# Patient Record
Sex: FEMALE | Race: WHITE | NOT HISPANIC OR LATINO | URBAN - METROPOLITAN AREA
[De-identification: names, ages, dates, MRNs, and addresses within clinical notes are randomized per-mention and may not be internally consistent; named-entity substitution may affect disease eponyms.]

---

## 2017-01-09 ENCOUNTER — EMERGENCY (EMERGENCY)
Facility: HOSPITAL | Age: 37
LOS: 1 days | Discharge: HOME | End: 2017-01-09
Attending: EMERGENCY MEDICINE | Admitting: EMERGENCY MEDICINE
Payer: COMMERCIAL

## 2017-01-09 VITALS
DIASTOLIC BLOOD PRESSURE: 83 MMHG | OXYGEN SATURATION: 100 % | TEMPERATURE: 98 F | HEART RATE: 73 BPM | SYSTOLIC BLOOD PRESSURE: 142 MMHG | RESPIRATION RATE: 18 BRPM

## 2017-01-09 VITALS
HEART RATE: 68 BPM | RESPIRATION RATE: 18 BRPM | DIASTOLIC BLOOD PRESSURE: 84 MMHG | OXYGEN SATURATION: 100 % | SYSTOLIC BLOOD PRESSURE: 145 MMHG

## 2017-01-09 DIAGNOSIS — Z98.891 HISTORY OF UTERINE SCAR FROM PREVIOUS SURGERY: Chronic | ICD-10-CM

## 2017-01-09 LAB
ALBUMIN SERPL ELPH-MCNC: 4.1 G/DL — SIGNIFICANT CHANGE UP (ref 3.3–5)
ALP SERPL-CCNC: 38 U/L — LOW (ref 40–120)
ALT FLD-CCNC: 19 U/L — SIGNIFICANT CHANGE UP (ref 4–33)
AST SERPL-CCNC: 21 U/L — SIGNIFICANT CHANGE UP (ref 4–32)
BASOPHILS # BLD AUTO: 0.03 K/UL — SIGNIFICANT CHANGE UP (ref 0–0.2)
BASOPHILS NFR BLD AUTO: 0.5 % — SIGNIFICANT CHANGE UP (ref 0–2)
BILIRUB SERPL-MCNC: 0.4 MG/DL — SIGNIFICANT CHANGE UP (ref 0.2–1.2)
BUN SERPL-MCNC: 10 MG/DL — SIGNIFICANT CHANGE UP (ref 7–23)
CALCIUM SERPL-MCNC: 9.1 MG/DL — SIGNIFICANT CHANGE UP (ref 8.4–10.5)
CHLORIDE SERPL-SCNC: 102 MMOL/L — SIGNIFICANT CHANGE UP (ref 98–107)
CK MB BLD-MCNC: 3.83 NG/ML — SIGNIFICANT CHANGE UP (ref 1–4.7)
CK MB BLD-MCNC: SIGNIFICANT CHANGE UP (ref 0–2.5)
CK SERPL-CCNC: 118 U/L — SIGNIFICANT CHANGE UP (ref 25–170)
CO2 SERPL-SCNC: 26 MMOL/L — SIGNIFICANT CHANGE UP (ref 22–31)
CREAT SERPL-MCNC: 0.45 MG/DL — LOW (ref 0.5–1.3)
EOSINOPHIL # BLD AUTO: 0.08 K/UL — SIGNIFICANT CHANGE UP (ref 0–0.5)
EOSINOPHIL NFR BLD AUTO: 1.4 % — SIGNIFICANT CHANGE UP (ref 0–6)
GLUCOSE SERPL-MCNC: 103 MG/DL — HIGH (ref 70–99)
HCT VFR BLD CALC: 37.8 % — SIGNIFICANT CHANGE UP (ref 34.5–45)
HGB BLD-MCNC: 13.1 G/DL — SIGNIFICANT CHANGE UP (ref 11.5–15.5)
IMM GRANULOCYTES NFR BLD AUTO: 0.2 % — SIGNIFICANT CHANGE UP (ref 0–1.5)
LIDOCAIN IGE QN: 28.6 U/L — SIGNIFICANT CHANGE UP (ref 7–60)
LYMPHOCYTES # BLD AUTO: 1.53 K/UL — SIGNIFICANT CHANGE UP (ref 1–3.3)
LYMPHOCYTES # BLD AUTO: 27.4 % — SIGNIFICANT CHANGE UP (ref 13–44)
MCHC RBC-ENTMCNC: 30.4 PG — SIGNIFICANT CHANGE UP (ref 27–34)
MCHC RBC-ENTMCNC: 34.7 % — SIGNIFICANT CHANGE UP (ref 32–36)
MCV RBC AUTO: 87.7 FL — SIGNIFICANT CHANGE UP (ref 80–100)
MONOCYTES # BLD AUTO: 0.23 K/UL — SIGNIFICANT CHANGE UP (ref 0–0.9)
MONOCYTES NFR BLD AUTO: 4.1 % — SIGNIFICANT CHANGE UP (ref 2–14)
NEUTROPHILS # BLD AUTO: 3.7 K/UL — SIGNIFICANT CHANGE UP (ref 1.8–7.4)
NEUTROPHILS NFR BLD AUTO: 66.4 % — SIGNIFICANT CHANGE UP (ref 43–77)
PLATELET # BLD AUTO: 226 K/UL — SIGNIFICANT CHANGE UP (ref 150–400)
PMV BLD: 10.3 FL — SIGNIFICANT CHANGE UP (ref 7–13)
POTASSIUM SERPL-MCNC: 3.9 MMOL/L — SIGNIFICANT CHANGE UP (ref 3.5–5.3)
POTASSIUM SERPL-SCNC: 3.9 MMOL/L — SIGNIFICANT CHANGE UP (ref 3.5–5.3)
PROT SERPL-MCNC: 6.5 G/DL — SIGNIFICANT CHANGE UP (ref 6–8.3)
RBC # BLD: 4.31 M/UL — SIGNIFICANT CHANGE UP (ref 3.8–5.2)
RBC # FLD: 12.1 % — SIGNIFICANT CHANGE UP (ref 10.3–14.5)
SODIUM SERPL-SCNC: 140 MMOL/L — SIGNIFICANT CHANGE UP (ref 135–145)
TROPONIN T SERPL-MCNC: < 0.06 NG/ML — SIGNIFICANT CHANGE UP (ref 0–0.06)
WBC # BLD: 5.58 K/UL — SIGNIFICANT CHANGE UP (ref 3.8–10.5)
WBC # FLD AUTO: 5.58 K/UL — SIGNIFICANT CHANGE UP (ref 3.8–10.5)

## 2017-01-09 PROCEDURE — 99285 EMERGENCY DEPT VISIT HI MDM: CPT | Mod: 25

## 2017-01-09 PROCEDURE — 93010 ELECTROCARDIOGRAM REPORT: CPT

## 2017-01-09 PROCEDURE — 71020: CPT | Mod: 26

## 2017-01-09 RX ORDER — NABUMETONE 750 MG
0 TABLET ORAL
Qty: 0 | Refills: 0 | COMMUNITY

## 2017-01-09 RX ORDER — FAMOTIDINE 10 MG/ML
20 INJECTION INTRAVENOUS ONCE
Qty: 0 | Refills: 0 | Status: COMPLETED | OUTPATIENT
Start: 2017-01-09 | End: 2017-01-09

## 2017-01-09 RX ORDER — ACETAMINOPHEN 500 MG
650 TABLET ORAL ONCE
Qty: 0 | Refills: 0 | Status: COMPLETED | OUTPATIENT
Start: 2017-01-09 | End: 2017-01-09

## 2017-01-09 RX ORDER — SODIUM CHLORIDE 9 MG/ML
1000 INJECTION INTRAMUSCULAR; INTRAVENOUS; SUBCUTANEOUS ONCE
Qty: 0 | Refills: 0 | Status: COMPLETED | OUTPATIENT
Start: 2017-01-09 | End: 2017-01-09

## 2017-01-09 RX ADMIN — FAMOTIDINE 20 MILLIGRAM(S): 10 INJECTION INTRAVENOUS at 11:39

## 2017-01-09 RX ADMIN — Medication 30 MILLILITER(S): at 11:39

## 2017-01-09 RX ADMIN — Medication 650 MILLIGRAM(S): at 14:23

## 2017-01-09 RX ADMIN — SODIUM CHLORIDE 1000 MILLILITER(S): 9 INJECTION INTRAMUSCULAR; INTRAVENOUS; SUBCUTANEOUS at 11:21

## 2017-01-09 NOTE — ED ADULT NURSE REASSESSMENT NOTE - NS ED NURSE REASSESS COMMENT FT1
pt states partial relief of gastric discomfort s/p GI meds. pt states she still feels "achy" all over however she is unsure if this is from her fibromyalgia. will give tylenol as ordered.

## 2017-01-09 NOTE — ED ADULT NURSE NOTE - CHPI ED SYMPTOMS NEG
no chills/no dizziness/no blood in stool/no bloody emesis/no coffee grounds emesis/no burning urination/no abdominal distension

## 2017-01-09 NOTE — ED PROVIDER NOTE - ATTENDING CONTRIBUTION TO CARE
37 yo female with epigastric pain, takes daily nsaids for fibromyalgia + took advil cold and sinus 3-4 times a day for 3-4 days last week, no PE and ACS risk factors afvss mild dsitress epigastric tenderness NO RUQ tenderness no le swelling/ pepcid maalox fu with gi

## 2017-01-09 NOTE — ED ADULT NURSE NOTE - OBJECTIVE STATEMENT
pt presents to ED hx of fibromyalgia, and recently treated for URI symptoms. Patient states her family is sick with RSV. patient c/o cough, chest, lower epigastrium, and shoulder and leg pain. pt states she is normally in leg and shoulder pain from fibromyalgia but the chest and epigastrum pain is new, also c/o fluttering in the chest. pt in no resp distress LS clear = bilat in no resp distress. CCM in place showing NSR with no ectopy noted, mild UMQ abd pain on palpation. IV 20 g L[facility] Labs sent TQ removed saline infusing.

## 2017-01-09 NOTE — ED PROVIDER NOTE - PROGRESS NOTE DETAILS
RANDY Cortez: pt feels well.  Results reviewed and provided to patient.  Discharge reviewed and discussed with patient.

## 2017-01-09 NOTE — ED PROVIDER NOTE - OBJECTIVE STATEMENT
35 y/o female with a hx of Fibromyalgia presents to the ER c/o 3-4 days of epigastric pain, palpitations, nausea and chest pain.  Pt recently started taking Nabumetone(NSAID) for her Fibromyalgia.  Pt reports nausea no vomiting.  Pt reports shortness of breath with pain.  Pt denies fevers, chills, recent travel, weakness, dizziness, calf pain.

## 2017-01-09 NOTE — ED PROVIDER NOTE - MEDICAL DECISION MAKING DETAILS
37 y/o female with a hx of Fibromyalgia presents to the ER c/o 3-4 days of epigastric pain, chest pain, palpitations and nausea pt recently started on new NSAID Nabumetone likely gastritis, EKG NSR, unlikely cardiac in nature Heart Score 0, will check labs, CXR, Pepcid, Maalox, IVF, reassess. 35 y/o female with a hx of Fibromyalgia presents to the ER c/o 3-4 days of epigastric pain, chest pain, palpitations and nausea pt recently started on new NSAID Nabumetone likely gastritis, EKG NSR, PERC negative, unlikely cardiac in nature Heart Score 0, will check labs, CXR, Pepcid, Maalox, IVF, reassess.

## 2017-01-09 NOTE — ED PROVIDER NOTE - CARE PLAN
Principal Discharge DX:	Gastritis Principal Discharge DX:	Gastritis  Instructions for follow-up, activity and diet:	Follow up with your Doctor in 1-2 days.  Follow up with your Rheumatologist.  Stop take Nabumetone.  Take Tylenol 650mg orally every 6 hours as needed for pain.  Take Pepcid 20mg orally once a day.  Return to the ER for any persistent/worsening or new symptoms weakness, dizziness, chest pain, shortness of breath, nausea, vomiting, abdominal pain or any concerning symptoms.

## 2017-01-09 NOTE — ED PROVIDER NOTE - PLAN OF CARE
Follow up with your Doctor in 1-2 days.  Follow up with your Rheumatologist.  Stop take Nabumetone.  Take Tylenol 650mg orally every 6 hours as needed for pain.  Take Pepcid 20mg orally once a day.  Return to the ER for any persistent/worsening or new symptoms weakness, dizziness, chest pain, shortness of breath, nausea, vomiting, abdominal pain or any concerning symptoms.

## 2018-05-12 ENCOUNTER — RESULT REVIEW (OUTPATIENT)
Age: 38
End: 2018-05-12

## 2018-12-11 ENCOUNTER — APPOINTMENT (OUTPATIENT)
Dept: GASTROENTEROLOGY | Facility: CLINIC | Age: 38
End: 2018-12-11
Payer: COMMERCIAL

## 2018-12-11 VITALS
TEMPERATURE: 98 F | HEIGHT: 62 IN | SYSTOLIC BLOOD PRESSURE: 110 MMHG | WEIGHT: 212 LBS | DIASTOLIC BLOOD PRESSURE: 70 MMHG | BODY MASS INDEX: 39.01 KG/M2

## 2018-12-11 DIAGNOSIS — R10.9 UNSPECIFIED ABDOMINAL PAIN: ICD-10-CM

## 2018-12-11 DIAGNOSIS — R07.0 PAIN IN THROAT: ICD-10-CM

## 2018-12-11 DIAGNOSIS — Z87.891 PERSONAL HISTORY OF NICOTINE DEPENDENCE: ICD-10-CM

## 2018-12-11 DIAGNOSIS — Z87.39 PERSONAL HISTORY OF OTHER DISEASES OF THE MUSCULOSKELETAL SYSTEM AND CONNECTIVE TISSUE: ICD-10-CM

## 2018-12-11 DIAGNOSIS — Z82.49 FAMILY HISTORY OF ISCHEMIC HEART DISEASE AND OTHER DISEASES OF THE CIRCULATORY SYSTEM: ICD-10-CM

## 2018-12-11 DIAGNOSIS — Z71.9 COUNSELING, UNSPECIFIED: ICD-10-CM

## 2018-12-11 DIAGNOSIS — Z63.5 DISRUPTION OF FAMILY BY SEPARATION AND DIVORCE: ICD-10-CM

## 2018-12-11 DIAGNOSIS — Z78.9 OTHER SPECIFIED HEALTH STATUS: ICD-10-CM

## 2018-12-11 DIAGNOSIS — Z87.2 PERSONAL HISTORY OF DISEASES OF THE SKIN AND SUBCUTANEOUS TISSUE: ICD-10-CM

## 2018-12-11 DIAGNOSIS — Z83.3 FAMILY HISTORY OF DIABETES MELLITUS: ICD-10-CM

## 2018-12-11 PROBLEM — M79.7 FIBROMYALGIA: Chronic | Status: ACTIVE | Noted: 2017-01-09

## 2018-12-11 PROCEDURE — 99204 OFFICE O/P NEW MOD 45 MIN: CPT

## 2018-12-11 RX ORDER — OMEPRAZOLE 40 MG/1
40 CAPSULE, DELAYED RELEASE ORAL
Qty: 30 | Refills: 2 | Status: ACTIVE | COMMUNITY
Start: 2018-12-11 | End: 1900-01-01

## 2018-12-11 SDOH — SOCIAL STABILITY - SOCIAL INSECURITY: DISRUPTION OF FAMILY BY SEPARATION AND DIVORCE: Z63.5

## 2019-01-21 ENCOUNTER — APPOINTMENT (OUTPATIENT)
Dept: GASTROENTEROLOGY | Facility: AMBULATORY MEDICAL SERVICES | Age: 39
End: 2019-01-21
Payer: COMMERCIAL

## 2019-01-21 PROCEDURE — 45385 COLONOSCOPY W/LESION REMOVAL: CPT

## 2019-01-21 PROCEDURE — 43239 EGD BIOPSY SINGLE/MULTIPLE: CPT

## 2019-02-05 ENCOUNTER — APPOINTMENT (OUTPATIENT)
Dept: GASTROENTEROLOGY | Facility: CLINIC | Age: 39
End: 2019-02-05
Payer: COMMERCIAL

## 2019-02-05 VITALS
BODY MASS INDEX: 37.91 KG/M2 | SYSTOLIC BLOOD PRESSURE: 119 MMHG | RESPIRATION RATE: 18 BRPM | HEIGHT: 62 IN | TEMPERATURE: 98.1 F | DIASTOLIC BLOOD PRESSURE: 75 MMHG | WEIGHT: 206 LBS | OXYGEN SATURATION: 98 % | HEART RATE: 90 BPM

## 2019-02-05 DIAGNOSIS — K63.5 POLYP OF COLON: ICD-10-CM

## 2019-02-05 DIAGNOSIS — K62.5 HEMORRHAGE OF ANUS AND RECTUM: ICD-10-CM

## 2019-02-05 DIAGNOSIS — D12.6 BENIGN NEOPLASM OF COLON, UNSPECIFIED: ICD-10-CM

## 2019-02-05 DIAGNOSIS — K64.9 UNSPECIFIED HEMORRHOIDS: ICD-10-CM

## 2019-02-05 PROCEDURE — 99213 OFFICE O/P EST LOW 20 MIN: CPT

## 2019-02-05 NOTE — ASSESSMENT
[FreeTextEntry1] : Patient with rectal polyp which was a tubular adenoma that was removed. She still has some rectal bleeding which is secondary to hemorrhoids. If this continues HET cauterization may be necessary.\par Upper endoscopy revealed gastritis. She will continue taking a PPI p.r.n.

## 2019-02-05 NOTE — HISTORY OF PRESENT ILLNESS
[FreeTextEntry1] : Patient is status post a colonoscopy which revealed a rectal polyp that was removed. This was a tubular adenoma.\par Patient still complains of frequent bouts of rectal bleeding due to hemorrhoids.\par \par She had an upper endoscopy that was essentially normal. Biopsies revealed some gastritis. H. pylori was negative. She has minimal symptoms and takes a PPI on a p.r.n. basis.

## 2021-12-16 ENCOUNTER — APPOINTMENT (OUTPATIENT)
Dept: SURGERY | Facility: CLINIC | Age: 41
End: 2021-12-16
Payer: COMMERCIAL

## 2021-12-16 VITALS
BODY MASS INDEX: 41.22 KG/M2 | TEMPERATURE: 98 F | HEART RATE: 90 BPM | SYSTOLIC BLOOD PRESSURE: 160 MMHG | WEIGHT: 224 LBS | OXYGEN SATURATION: 98 % | HEIGHT: 62 IN | RESPIRATION RATE: 18 BRPM | DIASTOLIC BLOOD PRESSURE: 88 MMHG

## 2021-12-16 DIAGNOSIS — K60.0 ACUTE ANAL FISSURE: ICD-10-CM

## 2021-12-16 PROCEDURE — 99203 OFFICE O/P NEW LOW 30 MIN: CPT

## 2021-12-16 RX ORDER — SODIUM SULFATE, POTASSIUM SULFATE, MAGNESIUM SULFATE 17.5; 3.13; 1.6 G/ML; G/ML; G/ML
17.5-3.13-1.6 SOLUTION, CONCENTRATE ORAL
Qty: 1 | Refills: 0 | Status: DISCONTINUED | COMMUNITY
Start: 2018-12-11 | End: 2021-12-16

## 2021-12-16 RX ORDER — NITROGLYCERIN 20 MG/G
2 OINTMENT TOPICAL
Qty: 1 | Refills: 0 | Status: ACTIVE | COMMUNITY
Start: 2021-12-16 | End: 1900-01-01

## 2021-12-16 RX ORDER — HYDROCORTISONE 2.5% 25 MG/G
2.5 CREAM TOPICAL TWICE DAILY
Qty: 1 | Refills: 2 | Status: DISCONTINUED | COMMUNITY
Start: 2018-12-11 | End: 2021-12-16

## 2021-12-16 NOTE — CONSULT LETTER
[Dear  ___] : Dear  [unfilled], [Consult Letter:] : I had the pleasure of evaluating your patient, [unfilled]. [Please see my note below.] : Please see my note below. [Consult Closing:] : Thank you very much for allowing me to participate in the care of this patient.  If you have any questions, please do not hesitate to contact me. [Sincerely,] : Sincerely, [FreeTextEntry3] : Carlos Daniel M.D., F.A.C.S, F.A.S.C.R.S

## 2021-12-16 NOTE — ASSESSMENT
[FreeTextEntry1] : In summary the patient has an acute posterior midline anal fissure.  I recommended warm tub baths fiber supplementation and stool softeners.  I prescribed topical nitroglycerin.  I instructed her to call me in a couple of weeks.  If her symptoms do not improve I would recommend rectal examination under anesthesia with either Botox injection or internal sphincterotomy.  Risks benefits and alternatives of each approach were explained.

## 2021-12-16 NOTE — HISTORY OF PRESENT ILLNESS
[FreeTextEntry1] : Mei is a 41 year old female here for a consultation. Last Colonoscopy from 2019 demonstrated pedunculated polyp measuring 1.5 cm found in the rectum. \par \par Pathology: Tubular adenoma. \par She state long standing  history of prolapsing hemorrhoids.  Reports having BRBPR dripping into the toilet bowl  with BM, Reports having normal daily bowl habits. She developed acute pain  and lump last week. States pain worse with Bm's .Currently no rectal bleed. She has been using tucks pads, Preparation H and sitz bath with no relief of symptoms. No fever or chills.   Denies the use of anticoagulants . PSH .  The pain feels sharp like a knife cutting her when she has a bowel movement.

## 2021-12-16 NOTE — PHYSICAL EXAM
[Normal Breath Sounds] : Normal breath sounds [Normal Heart Sounds] : normal heart sounds [No Rash or Lesion] : No rash or lesion [Oriented to Person] : oriented to person [Oriented to Place] : oriented to place [Oriented to Time] : oriented to time [Calm] : calm [Abdomen Tenderness] : ~T No ~M abdominal tenderness [No HSM] : no hepatosplenomegaly [JVD] : no jugular venous distention  [de-identified] : Perianal inspection reveals a nonthrombosed anterior midline external hemorrhoid.  Perianal inspection and digital rectal examination reveal an acute posterior midline anal fissure with associated sphincter spasm.  Anoscopy was deferred due to her level of discomfort. [de-identified] : NAD [de-identified] : Neuro- Cranial nerve grossly intact. Normal gait.  [de-identified] : ROM WNL

## 2024-04-25 ENCOUNTER — EMERGENCY (EMERGENCY)
Facility: HOSPITAL | Age: 44
LOS: 1 days | Discharge: HOME | End: 2024-04-25
Admitting: EMERGENCY MEDICINE
Payer: MEDICAID

## 2024-04-25 VITALS
OXYGEN SATURATION: 97 % | TEMPERATURE: 98 F | RESPIRATION RATE: 16 BRPM | DIASTOLIC BLOOD PRESSURE: 94 MMHG | HEART RATE: 85 BPM | SYSTOLIC BLOOD PRESSURE: 157 MMHG

## 2024-04-25 DIAGNOSIS — Z98.891 HISTORY OF UTERINE SCAR FROM PREVIOUS SURGERY: Chronic | ICD-10-CM

## 2024-04-25 PROCEDURE — 99284 EMERGENCY DEPT VISIT MOD MDM: CPT

## 2024-04-25 NOTE — ED ADULT TRIAGE NOTE - CHIEF COMPLAINT QUOTE
Pt c/o dog bite to L hand/wrist. Pt does not know owner or dog, was sent to ED by urgent care for XR and rabies vaccine. Had wound cleaned and received TDAP at urgent care. C/o pain to area. PMHx Fibromyalgia

## 2024-04-26 VITALS — WEIGHT: 216.05 LBS

## 2024-04-26 PROCEDURE — 73130 X-RAY EXAM OF HAND: CPT | Mod: 26,LT

## 2024-04-26 PROCEDURE — 73090 X-RAY EXAM OF FOREARM: CPT | Mod: 26,LT

## 2024-04-26 RX ORDER — HUMAN RABIES VIRUS IMMUNE GLOBULIN 150 [IU]/ML
1950 INJECTION, SOLUTION INTRAMUSCULAR ONCE
Refills: 0 | Status: COMPLETED | OUTPATIENT
Start: 2024-04-26 | End: 2024-04-26

## 2024-04-26 RX ORDER — METRONIDAZOLE 500 MG
500 TABLET ORAL ONCE
Refills: 0 | Status: COMPLETED | OUTPATIENT
Start: 2024-04-26 | End: 2024-04-26

## 2024-04-26 RX ORDER — RABIES VACC, HUMAN DIPLOID/PF 2.5 UNIT
1 VIAL (EA) INTRAMUSCULAR ONCE
Refills: 0 | Status: COMPLETED | OUTPATIENT
Start: 2024-04-26 | End: 2024-04-26

## 2024-04-26 RX ORDER — RABIES VACC, HUMAN DIPLOID/PF 2.5 UNIT
1 VIAL (EA) INTRAMUSCULAR ONCE
Refills: 0 | Status: DISCONTINUED | OUTPATIENT
Start: 2024-04-26 | End: 2024-04-26

## 2024-04-26 RX ORDER — METRONIDAZOLE 500 MG
1 TABLET ORAL
Qty: 12 | Refills: 0
Start: 2024-04-26 | End: 2024-04-29

## 2024-04-26 RX ORDER — HUMAN RABIES VIRUS IMMUNE GLOBULIN 150 [IU]/ML
1950 INJECTION, SOLUTION INTRAMUSCULAR ONCE
Refills: 0 | Status: DISCONTINUED | OUTPATIENT
Start: 2024-04-26 | End: 2024-04-26

## 2024-04-26 RX ADMIN — Medication 100 MILLIGRAM(S): at 02:30

## 2024-04-26 RX ADMIN — Medication 500 MILLIGRAM(S): at 02:30

## 2024-04-26 RX ADMIN — Medication 1 MILLILITER(S): at 02:32

## 2024-04-26 RX ADMIN — HUMAN RABIES VIRUS IMMUNE GLOBULIN 1950 UNIT(S): 150 INJECTION, SOLUTION INTRAMUSCULAR at 02:49

## 2024-04-26 NOTE — ED PROVIDER NOTE - CPE EDP SKIN NORM
Please notify patient of results.  The pathology results demonstrated Tubular adenoma.  C in 10  E in 1  FU in office for new onset Barretts
normal...

## 2024-04-26 NOTE — ED PROVIDER NOTE - NSFOLLOWUPINSTRUCTIONS_ED_ALL_ED_FT
You were given rabies immune globulin and your first rabies shot. Return to the ER on Monday 04/29 (day 3), Friday 5/3 (Day 7) and Friday 5/10 (day 14) for additional rabies vaccine doses. Take doxycycline that urgent care prescribed as well as Metronidazole which was also prescribed. Do not drink alcohol when taking the medication. Advance activity as tolerated.  Continue all previously prescribed medications as directed.  Follow up with your primary care physician in 48-72 hours- bring copies of your results.  Return to the ER for worsening or persistent symptoms, and/or ANY NEW OR CONCERNING SYMPTOMS. THIS INCLUDES BUT IS NOT LIMITED TO FEVER, CHILLS, NIGHTSWEATS, REDNESS, SWELLING, DISCHARGE OR FOR ANY OTHER SYMPTOMS THAT CONCERN YOU. If you have issues obtaining follow up, please call: [phone] to obtain a doctor or specialist who takes your insurance in your area.  You may call [phone] to make an appointment with the internal medicine clinic.

## 2024-04-26 NOTE — ED PROVIDER NOTE - OBJECTIVE STATEMENT
42 Y/O F PMH Fibromyalgia states that she was walking at approx 7PM on 4/25 and states a dog that is not known to her bit her on the L wrist. Pt states that she went to urgent care and had a tetanus shot and states doxycycline was sent to her pharmacy (is PCN allergic). Pt states that she does not know if the dog is vaccinated and did not talk to the owner after the incident. Pt states that the dog was gray, pt is unsure of breed. Pt states that she was referred to the ED by urgent care for rabies prophylaxis. Pt denies any other sx or acute complaints.

## 2024-04-26 NOTE — ED PROVIDER NOTE - CLINICAL SUMMARY MEDICAL DECISION MAKING FREE TEXT BOX
44 Y/O F PMH Fibromyalgia states that she was walking at approx 7PM on 4/25 and states a dog that is not known to her bit her on the L wrist. Pt states that she went to urgent care and had a tetanus shot and states doxycycline was sent to her pharmacy (is PCN allergic). Pt states that she does not know if the dog is vaccinated and did not talk to the owner after the incident. Pt states that the dog was gray, pt is unsure of breed. Will add flagyl for anaerobic coverage of the pt's bite. Rabies vaccination and immune globulin administered in wounds. Pt advised of close return precautions for possible worsening infection. pt was advised to complete the rabies vaccination series and was advised of the dates to return to the ER. Pt verbalized understanding of recommendations. The dog bite was reported using [facility]'s dog bite reporting platform.

## 2024-04-26 NOTE — ED ADULT NURSE NOTE - OBJECTIVE STATEMENT
Pt received in intake, aaox4, ambulatory, breathing even and unlabored in bed. Pt states that she was bit by an unknown dog today in the left forearm. No active bleeding at this time. Pt denies chest pain, SOB, dizziness, headache, blurry vision, chills. Bed in lowest position, call bell within reach.

## 2024-04-26 NOTE — ED PROVIDER NOTE - PATIENT PORTAL LINK FT
You can access the FollowMyHealth Patient Portal offered by [facility] by registering at the following website: [email or web address]. By joining [facility]’s FollowMyHealth portal, you will also be able to view your health information using other applications (apps) compatible with our system.

## 2024-04-26 NOTE — ED PROVIDER NOTE - MUSCULOSKELETAL, MLM
Spine appears normal, range of motion is not limited. LUE with two 1CM bite marks (on dorsum and volar aspects of the forearm) with surrounding tenderness without crepitus. thumb with intact skin and no osseous tenderness (despite question of foreign body on x-ray imaging.)

## 2024-04-26 NOTE — ED ADULT NURSE NOTE - NSFALLRISKASMTTYPE_ED_ALL_ED
Patient given verbal and written discharge instructions. Patient verbalized understanding of discharge instructions. Rx sent to [facility]. Afebrile. Denies pain. CMS intact to right lower extremity.    Initial (On Arrival)
